# Patient Record
Sex: MALE | Race: WHITE | NOT HISPANIC OR LATINO | Employment: OTHER | URBAN - NONMETROPOLITAN AREA
[De-identification: names, ages, dates, MRNs, and addresses within clinical notes are randomized per-mention and may not be internally consistent; named-entity substitution may affect disease eponyms.]

---

## 2019-06-07 ENCOUNTER — TRANSCRIBE ORDERS (OUTPATIENT)
Dept: ADMINISTRATIVE | Facility: HOSPITAL | Age: 59
End: 2019-06-07

## 2019-06-07 DIAGNOSIS — M43.26 FUSION OF SPINE OF LUMBAR REGION: Primary | ICD-10-CM

## 2019-06-07 DIAGNOSIS — M54.16 RADICULOPATHY OF LUMBAR REGION: ICD-10-CM

## 2019-06-08 ENCOUNTER — HOSPITAL ENCOUNTER (OUTPATIENT)
Dept: MRI IMAGING | Facility: HOSPITAL | Age: 59
Discharge: HOME | End: 2019-06-08
Admitting: ORTHOPAEDIC SURGERY

## 2019-06-08 DIAGNOSIS — M43.26 FUSION OF SPINE OF LUMBAR REGION: ICD-10-CM

## 2019-06-08 DIAGNOSIS — M54.16 RADICULOPATHY OF LUMBAR REGION: ICD-10-CM

## 2019-06-08 PROCEDURE — 72148 MRI LUMBAR SPINE W/O DYE: CPT

## 2019-06-08 PROCEDURE — 72148 MRI LUMBAR SPINE W/O DYE: CPT | Performed by: RADIOLOGY

## 2023-03-01 ENCOUNTER — HOSPITAL ENCOUNTER (OUTPATIENT)
Dept: CARDIOLOGY | Facility: HOSPITAL | Age: 63
Discharge: HOME | End: 2023-03-01
Payer: MEDICARE

## 2023-03-01 ENCOUNTER — HOSPITAL ENCOUNTER (OUTPATIENT)
Dept: GENERAL RADIOLOGY | Facility: HOSPITAL | Age: 63
Discharge: HOME | End: 2023-03-01
Payer: MEDICARE

## 2023-03-01 ENCOUNTER — TRANSCRIBE ORDERS (OUTPATIENT)
Dept: GENERAL RADIOLOGY | Facility: HOSPITAL | Age: 63
End: 2023-03-01
Payer: MEDICARE

## 2023-03-01 DIAGNOSIS — R06.09 OTHER FORMS OF DYSPNEA: ICD-10-CM

## 2023-03-01 DIAGNOSIS — R06.09 OTHER FORMS OF DYSPNEA: Primary | ICD-10-CM

## 2023-03-01 LAB
QT INTERVAL: 390 MS
QTC INTERVAL: 447 MS

## 2023-03-01 PROCEDURE — 93005 ELECTROCARDIOGRAM TRACING: CPT | Performed by: FAMILY MEDICINE

## 2023-03-01 PROCEDURE — 71046 X-RAY EXAM CHEST 2 VIEWS: CPT

## 2023-03-01 PROCEDURE — 93010 ELECTROCARDIOGRAM REPORT: CPT | Performed by: SPECIALIST

## 2023-03-01 PROCEDURE — 71046 X-RAY EXAM CHEST 2 VIEWS: CPT | Performed by: RADIOLOGY

## 2023-03-16 ENCOUNTER — OFFICE VISIT (OUTPATIENT)
Dept: CARDIOLOGY | Facility: CLINIC | Age: 63
End: 2023-03-16
Payer: MEDICARE

## 2023-03-16 VITALS
BODY MASS INDEX: 32.47 KG/M2 | OXYGEN SATURATION: 97 % | HEART RATE: 69 BPM | SYSTOLIC BLOOD PRESSURE: 120 MMHG | RESPIRATION RATE: 16 BRPM | DIASTOLIC BLOOD PRESSURE: 78 MMHG | WEIGHT: 226.8 LBS | HEIGHT: 70 IN

## 2023-03-16 DIAGNOSIS — R73.03 PREDIABETES: ICD-10-CM

## 2023-03-16 DIAGNOSIS — R07.2 PRECORDIAL CHEST PAIN: ICD-10-CM

## 2023-03-16 DIAGNOSIS — E78.5 DYSLIPIDEMIA: ICD-10-CM

## 2023-03-16 DIAGNOSIS — R06.02 SHORTNESS OF BREATH: Primary | ICD-10-CM

## 2023-03-16 DIAGNOSIS — I10 ESSENTIAL HYPERTENSION: ICD-10-CM

## 2023-03-16 DIAGNOSIS — G47.33 OSA (OBSTRUCTIVE SLEEP APNEA): ICD-10-CM

## 2023-03-16 DIAGNOSIS — F17.221 CHEWING TOBACCO NICOTINE DEPENDENCE IN REMISSION: ICD-10-CM

## 2023-03-16 DIAGNOSIS — F17.211 CIGARETTE NICOTINE DEPENDENCE IN REMISSION: ICD-10-CM

## 2023-03-16 PROCEDURE — 93000 ELECTROCARDIOGRAM COMPLETE: CPT | Performed by: SPECIALIST

## 2023-03-16 PROCEDURE — 3078F DIAST BP <80 MM HG: CPT | Performed by: SPECIALIST

## 2023-03-16 PROCEDURE — 3074F SYST BP LT 130 MM HG: CPT | Performed by: SPECIALIST

## 2023-03-16 PROCEDURE — 99204 OFFICE O/P NEW MOD 45 MIN: CPT | Performed by: SPECIALIST

## 2023-03-16 RX ORDER — CITALOPRAM 40 MG/1
40 TABLET ORAL NIGHTLY
COMMUNITY
Start: 2023-01-10

## 2023-03-16 RX ORDER — PRAVASTATIN SODIUM 40 MG
40 TABLET ORAL DAILY
COMMUNITY

## 2023-03-16 RX ORDER — GABAPENTIN 600 MG/1
600 TABLET ORAL EVERY 8 HOURS SCHEDULED
COMMUNITY
Start: 2023-03-09

## 2023-03-16 RX ORDER — AMITRIPTYLINE HYDROCHLORIDE 75 MG/1
150 TABLET, FILM COATED ORAL NIGHTLY
COMMUNITY

## 2023-03-16 RX ORDER — POTASSIUM CHLORIDE 20 MEQ/1
20 TABLET, EXTENDED RELEASE ORAL DAILY
COMMUNITY
Start: 2023-02-11

## 2023-03-16 RX ORDER — MELATONIN
2000 DAILY
COMMUNITY

## 2023-03-16 RX ORDER — HYDROCODONE BITARTRATE AND ACETAMINOPHEN 7.5; 325 MG/1; MG/1
TABLET ORAL
COMMUNITY
Start: 2023-03-09

## 2023-03-16 RX ORDER — POTASSIUM CHLORIDE 750 MG/1
10 TABLET, FILM COATED, EXTENDED RELEASE ORAL AS NEEDED
COMMUNITY
Start: 2022-10-12

## 2023-03-16 RX ORDER — FUROSEMIDE 20 MG/1
20 TABLET ORAL AS NEEDED
COMMUNITY
Start: 2022-10-12

## 2023-03-16 NOTE — PROGRESS NOTES
Subjective   Initial consultation, shortness of breath, chest pain  Robert Gay is a 62 y.o. male who presents to day for Shortness of Breath (Routine activity), Palpitations (Races,skips), Edema (LE, hx neuropathy), and Med Management (List provided).    CHIEF COMPLIANT  Chief Complaint   Patient presents with   • Shortness of Breath     Routine activity   • Palpitations     Races,skips   • Edema     LE, hx neuropathy   • Med Management     List provided       Active Problems:  Problem List Items Addressed This Visit        Cardiac and Vasculature    Precordial chest pain    Relevant Orders    Stress Test With Myocardial Perfusion One Day    Adult Transthoracic Echo Complete w/ Color, Spectral and Contrast if necessary per protocol    Dyslipidemia    Essential hypertension    Relevant Medications    furosemide (LASIX) 20 MG tablet    metoprolol tartrate (LOPRESSOR) 25 MG tablet       Endocrine and Metabolic    Prediabetes       Pulmonary and Pneumonias    Shortness of breath - Primary    Relevant Orders    Stress Test With Myocardial Perfusion One Day    Adult Transthoracic Echo Complete w/ Color, Spectral and Contrast if necessary per protocol       Sleep    RAKESH (obstructive sleep apnea)    Relevant Orders    Home Sleep Study       Tobacco    Chewing tobacco nicotine dependence in remission    Cigarette nicotine dependence in remission       HPI  HPI  Patient has been relatively sedentary because of chronic back pain with several back surgeries and neuropathy but since he had COVID-19 infection back in 2020 he has been significantly short of breath now his shortness of breath at rest and gets short of breath walking very short distances with mild left lower extremity edema he also noticed recently he gets chest discomfort on exertion on and off he also sleeps on 2 pillows at and he cannot lay flat because he gets out of breath he was diagnosed with sleep apnea in the past but he never uses CPAP he said  he was told that AHI was 30 he used to smoke he quit in  he smoked for 25 years about 2 packs/day he is to do it at the same time to only quit back too, he also has history of hypertension and diabetes as well as hyperlipidemia family history wise his father had PCI's and stents and his brother had CABG prepped with his 60  PRIOR MEDS  Current Outpatient Medications on File Prior to Visit   Medication Sig Dispense Refill   • amitriptyline (ELAVIL) 75 MG tablet Take 2 tablets by mouth Every Night. Takes 2 75mg tabs     • Cholecalciferol 25 MCG (1000 UT) tablet Take 2 tablets by mouth Daily.     • citalopram (CeleXA) 40 MG tablet 1 tablet Every Night.     • furosemide (LASIX) 20 MG tablet 1 tablet As Needed.     • gabapentin (NEURONTIN) 600 MG tablet 1 tablet Every 8 (Eight) Hours.     • HYDROcodone-acetaminophen (NORCO) 7.5-325 MG per tablet Every 6 (Six) Hours.     • metFORMIN (GLUCOPHAGE) 500 MG tablet 1 tablet 2 (Two) Times a Day.     • metoprolol tartrate (LOPRESSOR) 25 MG tablet 1 tablet 2 (Two) Times a Day.     • potassium chloride (K-DUR,KLOR-CON) 20 MEQ CR tablet 1 tablet Daily.     • potassium chloride 10 MEQ CR tablet 1 tablet As Needed.     • pravastatin (PRAVACHOL) 40 MG tablet Take 1 tablet by mouth Daily.       No current facility-administered medications on file prior to visit.       ALLERGIES  Compazine [prochlorperazine], Kefzol [cefazolin], and Keflex [cephalexin]    HISTORY  Past Medical History:   Diagnosis Date   • Hyperlipidemia    • Hypertension    • Neuropathy    • Sleep apnea        Social History     Socioeconomic History   • Marital status:    Tobacco Use   • Smoking status: Former     Packs/day: 2.00     Types: Cigarettes     Quit date:      Years since quittin.2   • Smokeless tobacco: Former     Quit date:    Substance and Sexual Activity   • Alcohol use: Not Currently   • Drug use: Never       Family History   Problem Relation Age of Onset   • Heart disease Father  "   • Heart attack Brother    • Heart disease Brother    • Heart disease Brother    • Heart disease Brother        Review of Systems   Respiratory: Positive for chest tightness and shortness of breath. Negative for apnea, cough, choking, wheezing and stridor.    Cardiovascular: Positive for palpitations and leg swelling. Negative for chest pain.       Objective     VITALS: /78 (BP Location: Right arm)   Pulse 69   Resp 16   Ht 177.8 cm (70\")   Wt 103 kg (226 lb 12.8 oz)   SpO2 97%   BMI 32.54 kg/m²     LABS:   Lab Results (most recent)     None          IMAGING:   XR Chest PA & Lateral    Result Date: 3/1/2023    No acute cardiopulmonary findings.  This report was finalized on 3/1/2023 1:38 PM by Dr. Jose E Easton MD.      CT cervical spine without contrast    Result Date: 11/28/2022  Degenerative disc disease. Images reviewed, interpreted, and dictated by LINDSAY Urbano MD      EXAM:  Physical Exam  Constitutional:       Appearance: He is well-developed.   HENT:      Head: Normocephalic and atraumatic.   Eyes:      Pupils: Pupils are equal, round, and reactive to light.   Neck:      Thyroid: No thyromegaly.      Vascular: No JVD.   Cardiovascular:      Rate and Rhythm: Normal rate and regular rhythm.      Chest Wall: PMI is not displaced.      Pulses: Normal pulses.      Heart sounds: Normal heart sounds, S1 normal and S2 normal. No murmur heard.    No friction rub. No gallop.   Pulmonary:      Effort: Pulmonary effort is normal. No respiratory distress.      Breath sounds: Normal breath sounds. No stridor. No wheezing or rales.   Chest:      Chest wall: No tenderness.   Abdominal:      General: Bowel sounds are normal. There is no distension.      Palpations: Abdomen is soft. There is no mass.      Tenderness: There is no abdominal tenderness. There is no guarding or rebound.   Genitourinary:     Comments: Epigastric hernia  Musculoskeletal:      Cervical back: Neck supple. No edema.   Skin:     " General: Skin is warm and dry.      Coloration: Skin is not pale.      Findings: No erythema or rash.   Neurological:      Mental Status: He is alert and oriented to person, place, and time.      Cranial Nerves: No cranial nerve deficit.      Coordination: Coordination normal.   Psychiatric:         Behavior: Behavior normal.         Procedure     ECG 12 Lead    Date/Time: 3/16/2023 10:50 AM  Performed by: Pinky Casillas MD  Authorized by: Pinky Casillas MD           EKG: Normal sinus rhythm otherwise within normal limits comparing with the EKG on 3/1/2023 no significant change       Assessment & Plan     Diagnoses and all orders for this visit:    1. Shortness of breath (Primary)  -     Stress Test With Myocardial Perfusion One Day; Future  -     Adult Transthoracic Echo Complete w/ Color, Spectral and Contrast if necessary per protocol; Future    2. Precordial chest pain  -     Stress Test With Myocardial Perfusion One Day; Future  -     Adult Transthoracic Echo Complete w/ Color, Spectral and Contrast if necessary per protocol; Future    3. Dyslipidemia    4. Prediabetes    5. Chewing tobacco nicotine dependence in remission    6. Cigarette nicotine dependence in remission    7. Essential hypertension    8. RAKESH (obstructive sleep apnea)  -     Home Sleep Study; Future    Other orders  -     ECG 12 Lead    1.  I am concerned about his symptoms of chest discomfort as well as shortness of breath we will proceed with stress testing for assessment of ischemia we will also get an echocardiogram to assess cardiac function wall motion and valve morphology  2.  At the moment his blood pressure is well controlled continue current management  3.  I reviewed his labs with slightly suboptimal LDL of 87 otherwise good lipid profile his hemoglobin A1c is well controlled at 5.6  4.  He had untreated sleep apnea which was apparently severely diagnosed in the past I am going to repeat his sleep study as this was done years ago and  if this is positive as he is still symptomatic we will go to refer him for CPAP trial  5.  He quit both chewing and smoking back in 2003    Return After stress test.                   MEDS ORDERED DURING VISIT:  No orders of the defined types were placed in this encounter.      As always, Diane Wyatt MD  I appreciate very much the opportunity to participate in the cardiovascular care of your patients. Please do not hesitate to call me with any questions with regards to Robert Gay evaluation and management.         This document has been electronically signed by Pinky Casillas MD  March 16, 2023 11:33 EDT    This note is dictated utilizing voice recognition software.

## 2023-04-10 ENCOUNTER — HOSPITAL ENCOUNTER (OUTPATIENT)
Dept: GENERAL RADIOLOGY | Facility: HOSPITAL | Age: 63
Discharge: HOME | End: 2023-04-10
Payer: MEDICARE

## 2023-04-10 ENCOUNTER — HOSPITAL ENCOUNTER (OUTPATIENT)
Dept: CARDIOLOGY | Facility: HOSPITAL | Age: 63
Discharge: HOME | End: 2023-04-10
Payer: MEDICARE

## 2023-04-10 ENCOUNTER — TRANSCRIBE ORDERS (OUTPATIENT)
Dept: LAB | Facility: HOSPITAL | Age: 63
End: 2023-04-10
Payer: MEDICARE

## 2023-04-10 DIAGNOSIS — R12 HEARTBURN: Primary | ICD-10-CM

## 2023-04-10 DIAGNOSIS — R12 HEARTBURN: ICD-10-CM

## 2023-04-10 PROCEDURE — 71046 X-RAY EXAM CHEST 2 VIEWS: CPT | Performed by: RADIOLOGY

## 2023-04-10 PROCEDURE — 93005 ELECTROCARDIOGRAM TRACING: CPT | Performed by: NURSE PRACTITIONER

## 2023-04-10 PROCEDURE — 71046 X-RAY EXAM CHEST 2 VIEWS: CPT

## 2023-04-11 LAB
QT INTERVAL: 374 MS
QTC INTERVAL: 431 MS

## 2023-04-20 ENCOUNTER — HOSPITAL ENCOUNTER (OUTPATIENT)
Dept: NUCLEAR MEDICINE | Facility: HOSPITAL | Age: 63
Discharge: HOME | End: 2023-04-20
Payer: MEDICARE

## 2023-04-20 ENCOUNTER — HOSPITAL ENCOUNTER (OUTPATIENT)
Dept: CARDIOLOGY | Facility: HOSPITAL | Age: 63
Discharge: HOME | End: 2023-04-20
Payer: MEDICARE

## 2023-04-20 DIAGNOSIS — R06.02 SHORTNESS OF BREATH: ICD-10-CM

## 2023-04-20 DIAGNOSIS — R07.2 PRECORDIAL CHEST PAIN: ICD-10-CM

## 2023-04-20 LAB
BH CV ECHO MEAS - ACS: 2.1 CM
BH CV ECHO MEAS - AO MAX PG: 2.06 MMHG
BH CV ECHO MEAS - AO MEAN PG: 1 MMHG
BH CV ECHO MEAS - AO ROOT DIAM: 3 CM
BH CV ECHO MEAS - AO V2 MAX: 71.7 CM/SEC
BH CV ECHO MEAS - AO V2 VTI: 16.3 CM
BH CV ECHO MEAS - EDV(CUBED): 117.6 ML
BH CV ECHO MEAS - EDV(MOD-SP4): 60.1 ML
BH CV ECHO MEAS - EF(MOD-SP4): 64.9 %
BH CV ECHO MEAS - ESV(CUBED): 42.9 ML
BH CV ECHO MEAS - ESV(MOD-SP4): 21.1 ML
BH CV ECHO MEAS - FS: 28.6 %
BH CV ECHO MEAS - IVS/LVPW: 1.4 CM
BH CV ECHO MEAS - IVSD: 0.7 CM
BH CV ECHO MEAS - LA DIMENSION: 3.2 CM
BH CV ECHO MEAS - LAT PEAK E' VEL: 7.2 CM/SEC
BH CV ECHO MEAS - LV DIASTOLIC VOL/BSA (35-75): 27.3 CM2
BH CV ECHO MEAS - LV MASS(C)D: 91.6 GRAMS
BH CV ECHO MEAS - LV SYSTOLIC VOL/BSA (12-30): 9.6 CM2
BH CV ECHO MEAS - LVIDD: 4.9 CM
BH CV ECHO MEAS - LVIDS: 3.5 CM
BH CV ECHO MEAS - LVOT AREA: 3.1 CM2
BH CV ECHO MEAS - LVOT DIAM: 2 CM
BH CV ECHO MEAS - LVPWD: 0.5 CM
BH CV ECHO MEAS - MED PEAK E' VEL: 5.9 CM/SEC
BH CV ECHO MEAS - MV A MAX VEL: 64.5 CM/SEC
BH CV ECHO MEAS - MV DEC SLOPE: 353 CM/SEC2
BH CV ECHO MEAS - MV E MAX VEL: 72.2 CM/SEC
BH CV ECHO MEAS - MV E/A: 1.12
BH CV ECHO MEAS - PA ACC TIME: 0.07 SEC
BH CV ECHO MEAS - PA PR(ACCEL): 48.2 MMHG
BH CV ECHO MEAS - SI(MOD-SP4): 17.7 ML/M2
BH CV ECHO MEAS - SV(MOD-SP4): 39 ML
BH CV ECHO MEAS - TAPSE (>1.6): 2.44 CM
BH CV ECHO MEASUREMENTS AVERAGE E/E' RATIO: 11.02
BH CV REST NUCLEAR ISOTOPE DOSE: 9.8 MCI
BH CV STRESS BP STAGE 1: NORMAL
BH CV STRESS COMMENTS STAGE 1: NORMAL
BH CV STRESS DOSE REGADENOSON STAGE 1: 0.4
BH CV STRESS DURATION MIN STAGE 1: 0
BH CV STRESS DURATION SEC STAGE 1: 10
BH CV STRESS HR STAGE 1: 85
BH CV STRESS NUCLEAR ISOTOPE DOSE: 30.3 MCI
BH CV STRESS PROTOCOL 1: NORMAL
BH CV STRESS RECOVERY BP: NORMAL MMHG
BH CV STRESS RECOVERY HR: 75 BPM
BH CV STRESS STAGE 1: 1
LV EF NUC BP: 58 %
MAXIMAL PREDICTED HEART RATE: 159 BPM
MAXIMAL PREDICTED HEART RATE: 159 BPM
PERCENT MAX PREDICTED HR: 53.46 %
STRESS BASELINE BP: NORMAL MMHG
STRESS BASELINE HR: 59 BPM
STRESS PERCENT HR: 63 %
STRESS POST PEAK BP: NORMAL MMHG
STRESS POST PEAK HR: 85 BPM
STRESS TARGET HR: 135 BPM
STRESS TARGET HR: 135 BPM

## 2023-04-20 PROCEDURE — A9500 TC99M SESTAMIBI: HCPCS | Performed by: SPECIALIST

## 2023-04-20 PROCEDURE — 93306 TTE W/DOPPLER COMPLETE: CPT

## 2023-04-20 PROCEDURE — 0 TECHNETIUM SESTAMIBI: Performed by: SPECIALIST

## 2023-04-20 PROCEDURE — 25010000002 REGADENOSON 0.4 MG/5ML SOLUTION: Performed by: SPECIALIST

## 2023-04-20 PROCEDURE — 93306 TTE W/DOPPLER COMPLETE: CPT | Performed by: SPECIALIST

## 2023-04-20 PROCEDURE — 93017 CV STRESS TEST TRACING ONLY: CPT

## 2023-04-20 PROCEDURE — 25010000002 SULFUR HEXAFLUORIDE MICROSPH 60.7-25 MG RECONSTITUTED SUSPENSION: Performed by: SPECIALIST

## 2023-04-20 PROCEDURE — 78452 HT MUSCLE IMAGE SPECT MULT: CPT

## 2023-04-20 RX ADMIN — TECHNETIUM TC 99M SESTAMIBI 1 DOSE: 1 INJECTION INTRAVENOUS at 10:15

## 2023-04-20 RX ADMIN — TECHNETIUM TC 99M SESTAMIBI 1 DOSE: 1 INJECTION INTRAVENOUS at 09:05

## 2023-04-20 RX ADMIN — REGADENOSON 0.4 MG: 0.08 INJECTION, SOLUTION INTRAVENOUS at 10:15

## 2023-04-20 RX ADMIN — SULFUR HEXAFLUORIDE 2 ML: KIT at 09:12

## 2023-04-27 ENCOUNTER — OFFICE VISIT (OUTPATIENT)
Dept: CARDIOLOGY | Facility: CLINIC | Age: 63
End: 2023-04-27
Payer: MEDICARE

## 2023-04-27 VITALS — WEIGHT: 229.4 LBS | BODY MASS INDEX: 32.84 KG/M2 | HEIGHT: 70 IN

## 2023-04-27 DIAGNOSIS — I10 ESSENTIAL HYPERTENSION: ICD-10-CM

## 2023-04-27 DIAGNOSIS — G47.33 OSA (OBSTRUCTIVE SLEEP APNEA): ICD-10-CM

## 2023-04-27 DIAGNOSIS — R00.2 PALPITATIONS: Primary | ICD-10-CM

## 2023-04-27 DIAGNOSIS — E78.5 DYSLIPIDEMIA: ICD-10-CM

## 2023-04-27 PROCEDURE — 1160F RVW MEDS BY RX/DR IN RCRD: CPT | Performed by: NURSE PRACTITIONER

## 2023-04-27 PROCEDURE — 99214 OFFICE O/P EST MOD 30 MIN: CPT | Performed by: NURSE PRACTITIONER

## 2023-04-27 PROCEDURE — 1159F MED LIST DOCD IN RCRD: CPT | Performed by: NURSE PRACTITIONER

## 2023-04-27 RX ORDER — ATORVASTATIN CALCIUM 40 MG/1
TABLET, FILM COATED ORAL
COMMUNITY
Start: 2023-04-06

## 2023-04-27 RX ORDER — DICLOFENAC SODIUM 75 MG/1
TABLET, DELAYED RELEASE ORAL
COMMUNITY
Start: 2023-03-22

## 2023-04-27 RX ORDER — FLUTICASONE PROPIONATE 50 MCG
SPRAY, SUSPENSION (ML) NASAL
COMMUNITY
Start: 2023-03-23

## 2023-04-27 NOTE — PROGRESS NOTES
[facility]             CeciliaJUANCHO Pike Jackie D, MD  Robert Gay  1960  04/27/2023    Patient Active Problem List   Diagnosis   • Shortness of breath   • Precordial chest pain   • Dyslipidemia   • Prediabetes   • Chewing tobacco nicotine dependence in remission   • Cigarette nicotine dependence in remission   • Essential hypertension   • RAKESH (obstructive sleep apnea)       Dear Diane Wyatt MD:    Subjective     Chief Complaint   Patient presents with   • Results     Stress, echo       HPI:     This is a 62 y.o. male with known past medical history of dyslipidemia, essential hypertension and prediabetes.    Robert Gay presents today for routine cardiology follow up.  Patient states he has been doing overall well since his last visit.  Continues to have daily intermittent palpitations.  Denies any chest pain or shortness of breath.  Blood pressure elevated in the office today in the 170s systolic however he states prior to coming here blood pressure was in the 130s and consistently stays in the 130s and 140s systolic.  He has yet to receive his home sleep study equipment therefore sleep study has not been performed yet.  Nuclear stress test was negative for ischemia.  Echocardiogram showed EF of 61 to 65% with small less than 1 cm pericardial effusion.    • Diagnostic Testing  1. Echocardiogram 4/2023: EF 61 to 65% with small less than 1 cm pericardial effusion  2. Nuclear stress test 4/2023: No evidence of ischemia     All other systems were reviewed and were negative.    Patient Active Problem List   Diagnosis   • Shortness of breath   • Precordial chest pain   • Dyslipidemia   • Prediabetes   • Chewing tobacco nicotine dependence in remission   • Cigarette nicotine dependence in remission   • Essential hypertension   • RAKESH (obstructive sleep apnea)       family history includes Heart attack in his brother; Heart disease in his  brother, brother, brother, and father.     reports that he quit smoking about 20 years ago. His smoking use included cigarettes. He smoked an average of 2 packs per day. He quit smokeless tobacco use about 20 years ago. He reports that he does not currently use alcohol. He reports that he does not use drugs.    Allergies   Allergen Reactions   • Compazine [Prochlorperazine] Anaphylaxis   • Kefzol [Cefazolin] Unknown - High Severity   • Keflex [Cephalexin] Rash         Current Outpatient Medications:   •  amitriptyline (ELAVIL) 75 MG tablet, Take 2 tablets by mouth Every Night. Takes 2 75mg tabs, Disp: , Rfl:   •  atorvastatin (LIPITOR) 40 MG tablet, , Disp: , Rfl:   •  cholecalciferol (VITAMIN D3) 25 MCG (1000 UT) tablet, Take 2 tablets by mouth Daily., Disp: , Rfl:   •  citalopram (CeleXA) 40 MG tablet, 1 tablet Every Night., Disp: , Rfl:   •  diclofenac (VOLTAREN) 75 MG EC tablet, , Disp: , Rfl:   •  fluticasone (FLONASE) 50 MCG/ACT nasal spray, , Disp: , Rfl:   •  furosemide (LASIX) 20 MG tablet, 1 tablet As Needed., Disp: , Rfl:   •  gabapentin (NEURONTIN) 600 MG tablet, 1 tablet Every 8 (Eight) Hours., Disp: , Rfl:   •  HYDROcodone-acetaminophen (NORCO) 7.5-325 MG per tablet, Every 6 (Six) Hours., Disp: , Rfl:   •  metFORMIN (GLUCOPHAGE) 500 MG tablet, 1 tablet 2 (Two) Times a Day., Disp: , Rfl:   •  metoprolol tartrate (LOPRESSOR) 25 MG tablet, 1 tablet 2 (Two) Times a Day., Disp: , Rfl:   •  nystatin (MYCOSTATIN) 100,000 unit/mL suspension, , Disp: , Rfl:   •  potassium chloride (K-DUR,KLOR-CON) 20 MEQ CR tablet, 1 tablet Daily., Disp: , Rfl:   •  potassium chloride 10 MEQ CR tablet, 1 tablet As Needed., Disp: , Rfl:       Physical Exam:  I have reviewed the patient's current vital signs as documented in the patient's EMR.   There were no vitals filed for this visit.  Body mass index is 32.92 kg/m².       04/27/23  1325   Weight: 104 kg (229 lb 6.4 oz)      Physical Exam  Constitutional:       General: He is  not in acute distress.     Appearance: Normal appearance. He is well-developed.   HENT:      Head: Normocephalic and atraumatic.      Mouth/Throat:      Mouth: Mucous membranes are moist.   Eyes:      Extraocular Movements: Extraocular movements intact.      Pupils: Pupils are equal, round, and reactive to light.   Neck:      Vascular: No JVD.   Cardiovascular:      Rate and Rhythm: Normal rate and regular rhythm.      Heart sounds: Normal heart sounds. No murmur heard.    No S3 or S4 sounds.   Pulmonary:      Effort: Pulmonary effort is normal. No respiratory distress.      Breath sounds: Normal breath sounds. No wheezing.   Abdominal:      General: Bowel sounds are normal. There is no distension.      Palpations: Abdomen is soft. There is no hepatomegaly.      Tenderness: There is no abdominal tenderness.   Musculoskeletal:         General: Normal range of motion.      Cervical back: Normal range of motion and neck supple.   Skin:     General: Skin is warm and dry.      Coloration: Skin is not jaundiced or pale.   Neurological:      General: No focal deficit present.      Mental Status: He is alert and oriented to person, place, and time. Mental status is at baseline.   Psychiatric:         Mood and Affect: Mood normal.         Behavior: Behavior normal.         Thought Content: Thought content normal.         Judgment: Judgment normal.         DATA REVIEWED:     TTE/GORAN:  Results for orders placed during the hospital encounter of 04/20/23    Adult Transthoracic Echo Complete w/ Color, Spectral and Contrast if necessary per protocol    Interpretation Summary  •  Left ventricular systolic function is normal. Left ventricular ejection fraction appears to be 61 - 65%.  •  Left ventricular diastolic function is consistent with (grade I) impaired relaxation.  •  There is a small (<1cm) pericardial effusion.  •  This is a technically difficult study.      Laboratory evaluations:    Lab Results   Component Value Date     BUN 12 07/23/2019    CREATININE 0.7 07/23/2019    BCR 17.1 07/23/2019    K 4.2 07/23/2019    CO2 30 07/23/2019    CALCIUM 9.3 07/23/2019    ALBUMIN 4.0 07/23/2019    LABIL2 1.3 07/23/2019    AST 28 07/23/2019    ALT 25 07/23/2019     Lab Results   Component Value Date    WBC 6.71 11/12/2021    HGB 11.1 (L) 11/19/2021    HCT 36.2 (L) 11/19/2021    .6 (H) 11/12/2021     11/12/2021     No results found for: CHOL, CHLPL, TRIG, HDL, LDL, LDLDIRECT  No results found for: TSH, L0OWJLU, F2ZPAJI, THYROIDAB  No results found for: HGBA1C  Lab Results   Component Value Date    ALT 25 07/23/2019     No results found for: HGBA1C  Lab Results   Component Value Date    CREATININE 0.7 07/23/2019     No results found for: IRON, TIBC, FERRITIN  Lab Results   Component Value Date    INR 1.0 11/12/2021    PROTIME 11.5 11/12/2021           --------------------------------------------------------------------------------------------------------------------------    ASSESSMENT/PLAN:      Diagnosis Plan   1. Palpitations  Cardiac Event Monitor      2. RAKESH (obstructive sleep apnea)        3. Essential hypertension        4. Dyslipidemia            1. Palpitations  2. Obstructive sleep apnea  • Patient has yet to receive his home sleep study equipment therefore a home sleep study has not been performed yet.  Follow-up on results.  Patient continues to have palpitations.  Obtain 14-day event monitor to evaluate for any arrhythmias.  Continue metoprolol.    3. Essential hypertension  • Blood pressure elevated in the office today in the 170s systolic.  Patient states he checks it every day at home and it has ranged from 130s to 140s.  I have asked him to bring his blood pressure machine in to his next visit with a blood pressure log so that we can titrate his medications accordingly.  • Advised low-sodium diet.    4.  Dyslipidemia  · Recently panel showed LDL at goal.  Continue statin.      This document has been @Electronically  signed by JUANCHO Carter, 04/27/23, 1:05 PM EDT.       Dictated Utilizing Dragon Dictation: Part of this note may be an electronic transcription/translation of spoken language to printed text using the Dragon Dictation System.    Follow-up appointment and medication changes provided in hand delivered After Visit Summary as well as reviewed in the room.

## 2023-05-22 ENCOUNTER — TREATMENT (OUTPATIENT)
Dept: CARDIOLOGY | Facility: CLINIC | Age: 63
End: 2023-05-22
Payer: MEDICARE

## 2023-05-22 DIAGNOSIS — R00.2 PALPITATIONS: ICD-10-CM

## 2023-05-31 ENCOUNTER — OFFICE VISIT (OUTPATIENT)
Dept: CARDIOLOGY | Facility: CLINIC | Age: 63
End: 2023-05-31

## 2023-05-31 VITALS
BODY MASS INDEX: 33.21 KG/M2 | WEIGHT: 232 LBS | HEIGHT: 70 IN | SYSTOLIC BLOOD PRESSURE: 153 MMHG | HEART RATE: 91 BPM | DIASTOLIC BLOOD PRESSURE: 96 MMHG | OXYGEN SATURATION: 97 %

## 2023-05-31 DIAGNOSIS — R06.02 SHORTNESS OF BREATH: ICD-10-CM

## 2023-05-31 DIAGNOSIS — E78.5 DYSLIPIDEMIA: ICD-10-CM

## 2023-05-31 DIAGNOSIS — G47.33 OSA (OBSTRUCTIVE SLEEP APNEA): Primary | ICD-10-CM

## 2023-05-31 DIAGNOSIS — I10 ESSENTIAL HYPERTENSION: ICD-10-CM

## 2023-05-31 PROCEDURE — 99214 OFFICE O/P EST MOD 30 MIN: CPT | Performed by: NURSE PRACTITIONER

## 2023-05-31 PROCEDURE — 3080F DIAST BP >= 90 MM HG: CPT | Performed by: NURSE PRACTITIONER

## 2023-05-31 PROCEDURE — 1159F MED LIST DOCD IN RCRD: CPT | Performed by: NURSE PRACTITIONER

## 2023-05-31 PROCEDURE — 1160F RVW MEDS BY RX/DR IN RCRD: CPT | Performed by: NURSE PRACTITIONER

## 2023-05-31 PROCEDURE — 3077F SYST BP >= 140 MM HG: CPT | Performed by: NURSE PRACTITIONER

## 2023-05-31 RX ORDER — LISINOPRIL 10 MG/1
10 TABLET ORAL DAILY
Qty: 90 TABLET | Refills: 3 | Status: SHIPPED | OUTPATIENT
Start: 2023-05-31

## 2023-05-31 NOTE — PROGRESS NOTES
[facility]             CeciliaJUANCHO Pike Jackie D, MD  Robert Gay  1960  05/31/2023    Patient Active Problem List   Diagnosis   • Shortness of breath   • Precordial chest pain   • Dyslipidemia   • Prediabetes   • Chewing tobacco nicotine dependence in remission   • Cigarette nicotine dependence in remission   • Essential hypertension   • RAKESH (obstructive sleep apnea)       Dear Diane Wyatt MD:    Subjective     Chief Complaint   Patient presents with   • Results     HEART MONITOR   • Shortness of Breath     SUDDEN GASPING       HPI:     This is a 62 y.o. male with known past medical history of dyslipidemia, essential hypertension and prediabetes.    Robert Gay presents today for routine cardiology follow up.  Patient states since his last visit he has been doing overall well.  States he does have some episodes of shortness of breath that come at random particularly when he is sitting.  He feels like he cannot catch his breath.  His home sleep study was positive for sleep apnea and his CPAP has been ordered by his primary doctor for which he is awaiting to receive.  Cardiac event monitor showed normal sinus rhythm with average heart rate in the 70s with sinus bradycardia in the 50s with no arrhythmias noted.  Systolic blood pressure has been ranging in the 140s and 150s.  He states he was taken off of lisinopril by his PCP a couple months ago due to lower blood pressures.  Denies any chest pain.  Recently panel showed LDL at goal.    • Diagnostic Testing  1. Echocardiogram 4/2023: EF 61 to 65% with small less than 1 cm pericardial effusion  2. Nuclear stress test 4/2023: No evidence of ischemia  3. Cardiac event monitor 5/2023: Normal sinus rhythm with average heart rate in the 70s with sinus bradycardia in the 50s with no arrhythmias noted     All other systems were reviewed and were negative.    Patient Active Problem List    Diagnosis   • Shortness of breath   • Precordial chest pain   • Dyslipidemia   • Prediabetes   • Chewing tobacco nicotine dependence in remission   • Cigarette nicotine dependence in remission   • Essential hypertension   • RAKESH (obstructive sleep apnea)       family history includes Heart attack in his brother; Heart disease in his brother, brother, brother, and father.     reports that he quit smoking about 20 years ago. His smoking use included cigarettes. He smoked an average of 2 packs per day. He quit smokeless tobacco use about 20 years ago. He reports that he does not currently use alcohol. He reports that he does not use drugs.    Allergies   Allergen Reactions   • Compazine [Prochlorperazine] Anaphylaxis   • Kefzol [Cefazolin] Unknown - High Severity   • Keflex [Cephalexin] Rash         Current Outpatient Medications:   •  amitriptyline (ELAVIL) 75 MG tablet, Take 2 tablets by mouth Every Night. Takes 2 75mg tabs, Disp: , Rfl:   •  atorvastatin (LIPITOR) 40 MG tablet, , Disp: , Rfl:   •  cholecalciferol (VITAMIN D3) 25 MCG (1000 UT) tablet, Take 2 tablets by mouth Daily., Disp: , Rfl:   •  citalopram (CeleXA) 40 MG tablet, 1 tablet Every Night., Disp: , Rfl:   •  diclofenac (VOLTAREN) 75 MG EC tablet, , Disp: , Rfl:   •  furosemide (LASIX) 20 MG tablet, 1 tablet As Needed., Disp: , Rfl:   •  gabapentin (NEURONTIN) 600 MG tablet, 1 tablet Every 8 (Eight) Hours., Disp: , Rfl:   •  HYDROcodone-acetaminophen (NORCO) 7.5-325 MG per tablet, Every 6 (Six) Hours., Disp: , Rfl:   •  metFORMIN (GLUCOPHAGE) 500 MG tablet, 1 tablet 2 (Two) Times a Day., Disp: , Rfl:   •  metoprolol tartrate (LOPRESSOR) 25 MG tablet, 1 tablet 2 (Two) Times a Day., Disp: , Rfl:   •  potassium chloride (K-DUR,KLOR-CON) 20 MEQ CR tablet, 1 tablet Daily., Disp: , Rfl:   •  potassium chloride 10 MEQ CR tablet, 1 tablet As Needed., Disp: , Rfl:   •  fluticasone (FLONASE) 50 MCG/ACT nasal spray, , Disp: , Rfl:   •  lisinopril  (PRINIVIL,ZESTRIL) 10 MG tablet, Take 1 tablet by mouth Daily., Disp: 90 tablet, Rfl: 3  •  nystatin (MYCOSTATIN) 100,000 unit/mL suspension, , Disp: , Rfl:       Physical Exam:  I have reviewed the patient's current vital signs as documented in the patient's EMR.   Vitals:    05/31/23 1431   BP: 153/96   Pulse: 91   SpO2: 97%     Body mass index is 33.29 kg/m².       05/31/23  1431   Weight: 105 kg (232 lb)      Physical Exam  Constitutional:       General: He is not in acute distress.     Appearance: Normal appearance. He is well-developed.   HENT:      Head: Normocephalic and atraumatic.      Mouth/Throat:      Mouth: Mucous membranes are moist.   Eyes:      Extraocular Movements: Extraocular movements intact.      Pupils: Pupils are equal, round, and reactive to light.   Neck:      Vascular: No JVD.   Cardiovascular:      Rate and Rhythm: Normal rate and regular rhythm.      Heart sounds: Normal heart sounds. No murmur heard.    No S3 or S4 sounds.   Pulmonary:      Effort: Pulmonary effort is normal. No respiratory distress.      Breath sounds: Normal breath sounds. No wheezing.   Abdominal:      General: Bowel sounds are normal. There is no distension.      Palpations: Abdomen is soft. There is no hepatomegaly.      Tenderness: There is no abdominal tenderness.   Musculoskeletal:         General: Normal range of motion.      Cervical back: Normal range of motion and neck supple.   Skin:     General: Skin is warm and dry.      Coloration: Skin is not jaundiced or pale.   Neurological:      General: No focal deficit present.      Mental Status: He is alert and oriented to person, place, and time. Mental status is at baseline.   Psychiatric:         Mood and Affect: Mood normal.         Behavior: Behavior normal.         Thought Content: Thought content normal.         Judgment: Judgment normal.          DATA REVIEWED:     TTE/GORAN:  Results for orders placed during the hospital encounter of 04/20/23    Adult  Transthoracic Echo Complete w/ Color, Spectral and Contrast if necessary per protocol    Interpretation Summary  •  Left ventricular systolic function is normal. Left ventricular ejection fraction appears to be 61 - 65%.  •  Left ventricular diastolic function is consistent with (grade I) impaired relaxation.  •  There is a small (<1cm) pericardial effusion.  •  This is a technically difficult study.      Laboratory evaluations:    Lab Results   Component Value Date    BUN 12 07/23/2019    CREATININE 0.7 07/23/2019    BCR 17.1 07/23/2019    K 4.2 07/23/2019    CO2 30 07/23/2019    CALCIUM 9.3 07/23/2019    ALBUMIN 4.0 07/23/2019    LABIL2 1.3 07/23/2019    AST 28 07/23/2019    ALT 25 07/23/2019     Lab Results   Component Value Date    WBC 6.71 11/12/2021    HGB 11.1 (L) 11/19/2021    HCT 36.2 (L) 11/19/2021    .6 (H) 11/12/2021     11/12/2021     No results found for: CHOL, CHLPL, TRIG, HDL, LDL, LDLDIRECT  No results found for: TSH, K8TCEUU, W5ICVDC, THYROIDAB  No results found for: HGBA1C  Lab Results   Component Value Date    ALT 25 07/23/2019     No results found for: HGBA1C  Lab Results   Component Value Date    CREATININE 0.7 07/23/2019     No results found for: IRON, TIBC, FERRITIN  Lab Results   Component Value Date    INR 1.0 11/12/2021    PROTIME 11.5 11/12/2021           --------------------------------------------------------------------------------------------------------------------------    ASSESSMENT/PLAN:      Diagnosis Plan   1. RAKESH (obstructive sleep apnea)        2. Essential hypertension  lisinopril (PRINIVIL,ZESTRIL) 10 MG tablet      3. Dyslipidemia        4. Shortness of breath  Complete PFT          1. Obstructive sleep apnea  • Home sleep study positive for sleep apnea.  Patient awaiting CPAP machine.    2. Essential hypertension  • Patient reports blood pressures in the 140s and 150s systolic at home.  He reports he was taken off lisinopril a couple months ago by his PCP due  to low blood pressure.  Given he has had some persistently elevated blood pressures we will start him back on low-dose lisinopril and titrate up as needed.  • Advise low-sodium diet.    3.  Shortness of breath  · Patient with intermittent shortness of breath particularly when just sitting and states that he loses his breath.  Does have around 30-year pack history of smoking.  Recent echocardiogram showed normal LV function.  Recent chest x-ray showed chronic appearing lung changes.  Obtain PFT.  If abnormal will refer to pulmonology.      This document has been @Electronically signed by JUANCHO Carter, 05/31/23, 1:40 PM EDT.       Dictated Utilizing Dragon Dictation: Part of this note may be an electronic transcription/translation of spoken language to printed text using the Dragon Dictation System.    Follow-up appointment and medication changes provided in hand delivered After Visit Summary as well as reviewed in the room.

## 2023-08-22 ENCOUNTER — OFFICE VISIT (OUTPATIENT)
Dept: PULMONOLOGY | Facility: CLINIC | Age: 63
End: 2023-08-22
Payer: MEDICARE

## 2023-08-22 VITALS
HEIGHT: 70 IN | SYSTOLIC BLOOD PRESSURE: 140 MMHG | RESPIRATION RATE: 18 BRPM | DIASTOLIC BLOOD PRESSURE: 90 MMHG | TEMPERATURE: 98 F | HEART RATE: 66 BPM | OXYGEN SATURATION: 96 % | WEIGHT: 232 LBS | BODY MASS INDEX: 33.21 KG/M2

## 2023-08-22 DIAGNOSIS — R93.89 ABNORMAL CHEST CT: Primary | ICD-10-CM

## 2023-08-22 DIAGNOSIS — R06.02 SOB (SHORTNESS OF BREATH): ICD-10-CM

## 2023-08-22 DIAGNOSIS — E66.9 OBESITY (BMI 30-39.9): ICD-10-CM

## 2023-08-22 DIAGNOSIS — J98.4 RESTRICTIVE LUNG DISEASE: ICD-10-CM

## 2023-08-22 PROCEDURE — 99204 OFFICE O/P NEW MOD 45 MIN: CPT | Performed by: INTERNAL MEDICINE

## 2023-08-22 PROCEDURE — 3077F SYST BP >= 140 MM HG: CPT | Performed by: INTERNAL MEDICINE

## 2023-08-22 PROCEDURE — 3080F DIAST BP >= 90 MM HG: CPT | Performed by: INTERNAL MEDICINE

## 2023-08-22 NOTE — PROGRESS NOTES
Subjective    Robert Gay presents for the following No chief complaint on file.  .    History of Present Illness   Were you born premature?  no    Any Childhood infections? no      Breathing problems when you were a child? no    Any childhood allergies?    no             At what age did you begin smoking? Former     Smoking marijuana? no    Any IV drugs? no    How many packs per day? Quit 2003   Smoked for 24  2 pack per day       Lung Function Test? yes  Chest X-Ray? no    CT Chest? no Allergy Test? no    Family hx of Lung disease or Lung Cancer?no    If FHx is posivitive for lung cancer, what is the relationship of the family member?     Any hospitalization in the last year? no    How far can you walk without getting short of breath? 20-30 feet    Any coughing? no    Any wheezing? no    Acid Reflux? yes    Do you snore? Yes, on CPAP     Daytime Fatigue? yes    Any pets? no   Any pet allergies? no    Occupation? Retired     Have you been exposed to any chemicals at your job? Not sure     What inhalers are you currently using? none    Have you had the Influenza Vaccine? yes    Would you like to receive this Vaccine today? no    Have you had the Pneumonia Vaccine?  yes   Would you like to receive this Vaccine today? No  Above-mentioned questionnaire was reviewed by me in great detail.  Accompanied by wife.  Review of Systems  Positive for shortness of breath on exertion otherwise negative.  Active Problems:  Problem List Items Addressed This Visit          Endocrine and Metabolic    Obesity (BMI 30-39.9)       Pulmonary and Pneumonias    SOB (shortness of breath)    Restrictive lung disease       Symptoms and Signs    Abnormal chest CT - Primary    Relevant Orders    CT Chest Without Contrast       Past Medical History:  Past Medical History:   Diagnosis Date    Hyperlipidemia     Hypertension     Neuropathy     Sleep apnea        Family History:  Family History   Problem Relation Age of Onset    Heart  disease Father     Heart attack Brother     Heart disease Brother     Heart disease Brother     Heart disease Brother        Social History:  Social History     Tobacco Use    Smoking status: Former     Packs/day: 2.00     Types: Cigarettes     Quit date:      Years since quittin.6    Smokeless tobacco: Former     Quit date:    Substance Use Topics    Alcohol use: Not Currently       Current Medications:  Current Outpatient Medications   Medication Sig Dispense Refill    amitriptyline (ELAVIL) 75 MG tablet Take 2 tablets by mouth Every Night. Takes 2 75mg tabs      atorvastatin (LIPITOR) 40 MG tablet       cholecalciferol (VITAMIN D3) 25 MCG (1000 UT) tablet Take 2 tablets by mouth Daily.      citalopram (CeleXA) 40 MG tablet 1 tablet Every Night.      diclofenac (VOLTAREN) 75 MG EC tablet       fluticasone (FLONASE) 50 MCG/ACT nasal spray  (Patient not taking: Reported on 2023)      furosemide (LASIX) 20 MG tablet 1 tablet As Needed.      gabapentin (NEURONTIN) 600 MG tablet 1 tablet Every 8 (Eight) Hours.      HYDROcodone-acetaminophen (NORCO) 7.5-325 MG per tablet Every 6 (Six) Hours.      lisinopril (PRINIVIL,ZESTRIL) 10 MG tablet Take 1 tablet by mouth Daily. 90 tablet 3    metFORMIN (GLUCOPHAGE) 500 MG tablet 1 tablet 2 (Two) Times a Day.      metoprolol tartrate (LOPRESSOR) 25 MG tablet 1 tablet 2 (Two) Times a Day.      nystatin (MYCOSTATIN) 100,000 unit/mL suspension  (Patient not taking: Reported on 2023)      potassium chloride (K-DUR,KLOR-CON) 20 MEQ CR tablet 1 tablet Daily.      potassium chloride 10 MEQ CR tablet 1 tablet As Needed.       No current facility-administered medications for this visit.       Allergies:  Allergies   Allergen Reactions    Prochlorperazine Anaphylaxis and Other (See Comments)     HAS NEVER TAKEN BECAUSE HIS BROTHER HAD REACTION TO IT    Cefazolin Unknown - High Severity, Other (See Comments) and Hives    Cephalexin Rash and Hives       Vitals:  BP  "140/90   Pulse 66   Temp 98 øF (36.7 øC) (Temporal)   Resp 18   Ht 177.8 cm (70\")   Wt 105 kg (232 lb)   SpO2 96%   BMI 33.29 kg/mý     Imaging:    Imaging Results (Most Recent)       None            Pulmonary Functions Testing Results:    No results found for: FEV1, FVC, PFC3JMX, TLC, DLCO    Results for orders placed or performed during the hospital encounter of 04/20/23   Stress Test With Myocardial Perfusion One Day   Result Value Ref Range    Target HR (85%) 135 bpm    Max. Pred. HR (100%) 159 bpm    BH CV REST NUCLEAR ISOTOPE DOSE 9.8 mCi    BH CV STRESS NUCLEAR ISOTOPE DOSE 30.3 mCi    BH CV STRESS PROTOCOL 1 Pharmacologic     Stage 1 1     HR Stage 1 85     BP Stage 1 139/78     Duration Min Stage 1 0     Duration Sec Stage 1 10     Stress Dose Regadenoson Stage 1 0.4     Stress Comments Stage 1 10 sec bolus injection     Baseline HR 59 bpm    Baseline /96 mmHg    Peak HR 85 bpm    Percent Max Pred HR 53.46 %    Percent Target HR 63 %    Peak /78 mmHg    Recovery HR 75 bpm    Recovery /98 mmHg    Nuc Stress EF 58 %       Objective   Physical Exam  General- normal in appearance, not in any acute distress    HEENT- pupils equally reactive to light, normal in size, no scleral icterus    Neck-supple    Respiratory-respirations normal-on auscultation no wheezing no crackles,     Cardiovascular-  Normal S1 and S2. No S3, S4 or murmurs. No JVD, no carotid bruit and no edema, pulses normal bilaterally     GI-nontender nondistended bowel sounds positive    CNS-nonfocal    Musculoskeletal -no edema  Extremities- no obvious deformity noticed     Psychiatric-mood good, good eye contact, alert awake oriented  Skin- no visible rash        Assessment & Plan   Shortness of breath-likely multifactorial related to patient's deconditioning and restrictive lung disease.  Latest chest x-ray and PFT reviewed.    PFT showed normal spirometry diffusion capacity was normal and mild restrictive lung disease.  " Patient's extreme shortness of breath is likely not related to patient's restrictive lung disease which is mild.    Echo reviewed and shows grade 1 diastolic dysfunction which is also most likely not attributing to patient's shortness of breath.  Patient's shortness of breath is most likely due to deconditioning and back pain issues.  Patient was educated about it.  Results for orders placed during the hospital encounter of 04/20/23    Adult Transthoracic Echo Complete w/ Color, Spectral and Contrast if necessary per protocol    Interpretation Summary    Left ventricular systolic function is normal. Left ventricular ejection fraction appears to be 61 - 65%.    Left ventricular diastolic function is consistent with (grade I) impaired relaxation.    There is a small (<1cm) pericardial effusion.    This is a technically difficult study.      Abnormal chest x-ray-we will get CT chest to better characterize the lung parenchyma.    Obese-BMI 33.29.  did diet and exercise counseling.      ICD-10-CM ICD-9-CM   1. Abnormal chest CT  R93.89 793.2   2. SOB (shortness of breath)  R06.02 786.05   3. Obesity (BMI 30-39.9)  E66.9 278.00   4. Restrictive lung disease  J98.4 518.89       Return in about 6 weeks (around 10/3/2023).

## 2025-07-17 ENCOUNTER — HOSPITAL ENCOUNTER (OUTPATIENT)
Facility: HOSPITAL | Age: 65
Discharge: HOME | End: 2025-07-17
Admitting: ORTHOPAEDIC SURGERY
Payer: MEDICARE

## 2025-07-17 ENCOUNTER — TRANSCRIBE ORDERS (OUTPATIENT)
Dept: LAB | Facility: HOSPITAL | Age: 65
End: 2025-07-17
Payer: MEDICARE

## 2025-07-17 DIAGNOSIS — M47.22 CERVICAL SPONDYLOSIS WITH RADICULOPATHY: Primary | ICD-10-CM

## 2025-07-17 DIAGNOSIS — M47.22 CERVICAL SPONDYLOSIS WITH RADICULOPATHY: ICD-10-CM

## 2025-07-17 PROCEDURE — 72040 X-RAY EXAM NECK SPINE 2-3 VW: CPT
